# Patient Record
Sex: MALE
[De-identification: names, ages, dates, MRNs, and addresses within clinical notes are randomized per-mention and may not be internally consistent; named-entity substitution may affect disease eponyms.]

---

## 2020-08-19 ENCOUNTER — OFFICE VISIT (OUTPATIENT)
Age: 45
End: 2020-08-19

## 2020-08-19 ENCOUNTER — TELEPHONE ENCOUNTER (OUTPATIENT)
Dept: URBAN - METROPOLITAN AREA CLINIC 9 | Facility: CLINIC | Age: 45
End: 2020-08-19

## 2020-09-10 ENCOUNTER — OFFICE VISIT (OUTPATIENT)
Dept: URBAN - METROPOLITAN AREA CLINIC 7 | Facility: CLINIC | Age: 45
End: 2020-09-10

## 2020-09-21 ENCOUNTER — OFFICE VISIT (OUTPATIENT)
Dept: URBAN - METROPOLITAN AREA CLINIC 7 | Facility: CLINIC | Age: 45
End: 2020-09-21

## 2020-09-24 ENCOUNTER — TELEPHONE ENCOUNTER (OUTPATIENT)
Dept: URBAN - METROPOLITAN AREA CLINIC 9 | Facility: CLINIC | Age: 45
End: 2020-09-24

## 2022-07-30 ENCOUNTER — TELEPHONE ENCOUNTER (OUTPATIENT)
Age: 47
End: 2022-07-30

## 2022-07-31 ENCOUNTER — TELEPHONE ENCOUNTER (OUTPATIENT)
Age: 47
End: 2022-07-31

## 2024-03-14 ENCOUNTER — OV NP (OUTPATIENT)
Dept: URBAN - METROPOLITAN AREA CLINIC 7 | Facility: CLINIC | Age: 49
End: 2024-03-14

## 2024-03-14 RX ORDER — TRAZODONE HYDROCHLORIDE 50 MG/1
TABLET ORAL
Qty: 60 TABLET | Refills: 1 | Status: ACTIVE | COMMUNITY

## 2024-03-14 RX ORDER — SERTRALINE HYDROCHLORIDE 50 MG/1
TAKE 1 TABLET BY MOUTH EVERY DAY TABLET, FILM COATED ORAL
Qty: 30 EACH | Refills: 0 | Status: ACTIVE | COMMUNITY

## 2024-03-14 RX ORDER — PROPRANOLOL HYDROCHLORIDE 10 MG/1
TAKE 1 TABLET BY MOUTH TWICE DAILY AS NEEDED FOR ANXIETY /PANIC ATTACKS TABLET ORAL
Qty: 60 EACH | Refills: 0 | Status: ACTIVE | COMMUNITY

## 2024-03-14 RX ORDER — FLUOXETINE 40 MG/1
CAPSULE ORAL
Qty: 30 APPLICATOR | Refills: 1 | Status: ACTIVE | COMMUNITY

## 2024-03-14 RX ORDER — FLUOXETINE 20 MG/1
TAKE 1 CAPSULE BY MOUTH EVERY DAY FOR 7 DAYS CAPSULE ORAL
Qty: 7 EACH | Refills: 0 | Status: ACTIVE | COMMUNITY

## 2024-03-14 RX ORDER — CLONIDINE HYDROCHLORIDE 0.1 MG/1
TAKE 1 TABLET BY MOUTH TWICE DAILY AS NEEDED FOR PANIC ATTACKS TABLET ORAL
Qty: 60 EACH | Refills: 0 | Status: ACTIVE | COMMUNITY

## 2024-03-14 NOTE — HPI-TODAY'S VISIT:
Patient is a practice and is being seen for elevated liver enzymes.  BMI 34.5.  Due to some aches and pains and myalgias he has been having recently he did have a pretty extensive blood panel done by his primary physician.  Labs including hepatitis screen, A1c, TSH, CBC, ASHU, CPK, ESR, CRP, HIV, PT/INR have been previously ordered.  Labs in early February demonstrated a glucose of 133, creatinine 1.04, , , hemoglobin A1c of 5.8%, hemoglobin 16.2 with an MCV of 102.9, platelets 248, normal TSH, ESR 17.  CRP 6.1 with a negative rheumatoid factor and negative Lyme antibodies.  ASHU negative.  HIV negative.  Hep B surface antigen and hep C antibody negative.